# Patient Record
Sex: FEMALE | Race: WHITE | NOT HISPANIC OR LATINO | URBAN - METROPOLITAN AREA
[De-identification: names, ages, dates, MRNs, and addresses within clinical notes are randomized per-mention and may not be internally consistent; named-entity substitution may affect disease eponyms.]

---

## 2017-11-13 ENCOUNTER — IMPORTED ENCOUNTER (OUTPATIENT)
Dept: URBAN - METROPOLITAN AREA CLINIC 59 | Facility: CLINIC | Age: 47
End: 2017-11-13

## 2017-11-13 PROBLEM — H40.053 OCULAR HYPERTENSION, BILATERAL: Noted: 2017-11-13

## 2017-11-13 PROBLEM — H04.122 TEAR FILM INSUFFICIENCY OF LT LACRIMAL GLAND: Noted: 2017-11-13

## 2017-11-13 PROBLEM — H04.121 TEAR FILM INSUFFICIENCY OF RT LACRIMAL GLAND: Noted: 2017-11-13

## 2017-11-13 PROCEDURE — 92014 COMPRE OPH EXAM EST PT 1/>: CPT

## 2017-11-13 PROCEDURE — 83861 MICROFLUID ANALY TEARS: CPT

## 2018-11-12 ENCOUNTER — IMPORTED ENCOUNTER (OUTPATIENT)
Dept: URBAN - METROPOLITAN AREA CLINIC 59 | Facility: CLINIC | Age: 48
End: 2018-11-12

## 2018-11-12 PROBLEM — H04.121 TEAR FILM INSUFFICIENCY OF RT LACRIMAL GLAND: Noted: 2018-11-12

## 2018-11-12 PROBLEM — H40.053 OCULAR HYPERTENSION, BILATERAL: Noted: 2018-11-12

## 2018-11-12 PROBLEM — H04.122 TEAR FILM INSUFFICIENCY OF LT LACRIMAL GLAND: Noted: 2018-11-12

## 2018-11-12 PROCEDURE — 92020 GONIOSCOPY: CPT

## 2018-11-12 PROCEDURE — 92133 CPTRZD OPH DX IMG PST SGM ON: CPT

## 2018-11-12 PROCEDURE — 92014 COMPRE OPH EXAM EST PT 1/>: CPT

## 2018-11-12 PROCEDURE — 92083 EXTENDED VISUAL FIELD XM: CPT

## 2018-12-20 ENCOUNTER — IMPORTED ENCOUNTER (OUTPATIENT)
Dept: URBAN - METROPOLITAN AREA CLINIC 59 | Facility: CLINIC | Age: 48
End: 2018-12-20

## 2018-12-20 PROBLEM — H40.053 OCULAR HYPERTENSION, BILATERAL: Noted: 2018-12-20

## 2018-12-20 PROCEDURE — 92012 INTRM OPH EXAM EST PATIENT: CPT

## 2019-04-18 ENCOUNTER — IMPORTED ENCOUNTER (OUTPATIENT)
Dept: URBAN - METROPOLITAN AREA CLINIC 59 | Facility: CLINIC | Age: 49
End: 2019-04-18

## 2019-04-18 PROBLEM — H04.122 TEAR FILM INSUFFICIENCY OF LT LACRIMAL GLAND: Noted: 2019-04-18

## 2019-04-18 PROBLEM — H40.053 OCULAR HYPERTENSION, BILATERAL: Noted: 2019-04-18

## 2019-04-18 PROBLEM — H04.121 TEAR FILM INSUFFICIENCY OF RT LACRIMAL GLAND: Noted: 2019-04-18

## 2019-04-18 PROCEDURE — 83861 MICROFLUID ANALY TEARS: CPT

## 2019-04-18 PROCEDURE — 92012 INTRM OPH EXAM EST PATIENT: CPT

## 2019-07-30 ENCOUNTER — RX ONLY (OUTPATIENT)
Age: 49
Setting detail: RX ONLY
End: 2019-07-30

## 2019-07-30 ENCOUNTER — APPOINTMENT (RX ONLY)
Dept: URBAN - METROPOLITAN AREA CLINIC 348 | Facility: CLINIC | Age: 49
Setting detail: DERMATOLOGY
End: 2019-07-30

## 2019-07-30 DIAGNOSIS — Z80.8 FAMILY HISTORY OF MALIGNANT NEOPLASM OF OTHER ORGANS OR SYSTEMS: ICD-10-CM

## 2019-07-30 DIAGNOSIS — L81.4 OTHER MELANIN HYPERPIGMENTATION: ICD-10-CM

## 2019-07-30 DIAGNOSIS — L23.7 ALLERGIC CONTACT DERMATITIS DUE TO PLANTS, EXCEPT FOOD: ICD-10-CM

## 2019-07-30 DIAGNOSIS — L259 CONTACT DERMATITIS AND OTHER ECZEMA, UNSPECIFIED CAUSE: ICD-10-CM

## 2019-07-30 DIAGNOSIS — D485 NEOPLASM OF UNCERTAIN BEHAVIOR OF SKIN: ICD-10-CM

## 2019-07-30 DIAGNOSIS — D18.0 HEMANGIOMA: ICD-10-CM

## 2019-07-30 DIAGNOSIS — L82.1 OTHER SEBORRHEIC KERATOSIS: ICD-10-CM

## 2019-07-30 DIAGNOSIS — D22 MELANOCYTIC NEVI: ICD-10-CM

## 2019-07-30 PROBLEM — D18.01 HEMANGIOMA OF SKIN AND SUBCUTANEOUS TISSUE: Status: ACTIVE | Noted: 2019-07-30

## 2019-07-30 PROBLEM — D22.5 MELANOCYTIC NEVI OF TRUNK: Status: ACTIVE | Noted: 2019-07-30

## 2019-07-30 PROBLEM — L30.8 OTHER SPECIFIED DERMATITIS: Status: ACTIVE | Noted: 2019-07-30

## 2019-07-30 PROBLEM — D48.5 NEOPLASM OF UNCERTAIN BEHAVIOR OF SKIN: Status: ACTIVE | Noted: 2019-07-30

## 2019-07-30 PROCEDURE — ? SHAVE REMOVAL

## 2019-07-30 PROCEDURE — ? TREATMENT REGIMEN

## 2019-07-30 PROCEDURE — ? COUNSELING

## 2019-07-30 PROCEDURE — ? PRESCRIPTION

## 2019-07-30 PROCEDURE — 11300 SHAVE SKIN LESION 0.5 CM/<: CPT

## 2019-07-30 PROCEDURE — 99203 OFFICE O/P NEW LOW 30 MIN: CPT | Mod: 25

## 2019-07-30 RX ORDER — TRIAMCINOLONE ACETONIDE 1 MG/G
CREAM TOPICAL
Qty: 1 | Refills: 1 | Status: ERX | COMMUNITY
Start: 2019-07-30

## 2019-07-30 RX ORDER — CLOCORTOLONE PIVALATE 0 G/G
CREAM TOPICAL
Qty: 1 | Refills: 0 | Status: ERX | COMMUNITY
Start: 2019-07-30

## 2019-07-30 RX ORDER — HYDROCORTISONE 25 MG/G
CREAM TOPICAL
Qty: 1 | Refills: 1 | Status: ERX | COMMUNITY
Start: 2019-07-30

## 2019-07-30 RX ADMIN — TRIAMCINOLONE ACETONIDE: 1 CREAM TOPICAL at 15:20

## 2019-07-30 RX ADMIN — CLOCORTOLONE PIVALATE: 0 CREAM TOPICAL at 15:31

## 2019-07-30 ASSESSMENT — LOCATION DETAILED DESCRIPTION DERM
LOCATION DETAILED: EPIGASTRIC SKIN
LOCATION DETAILED: RIGHT DISTAL PRETIBIAL REGION
LOCATION DETAILED: RIGHT INFERIOR UPPER BACK
LOCATION DETAILED: LEFT LATERAL FOREHEAD
LOCATION DETAILED: RIGHT LATERAL FOREHEAD
LOCATION DETAILED: LEFT DISTAL PRETIBIAL REGION
LOCATION DETAILED: RIGHT SUPERIOR MEDIAL UPPER BACK
LOCATION DETAILED: LEFT MEDIAL SUPERIOR CHEST

## 2019-07-30 ASSESSMENT — LOCATION SIMPLE DESCRIPTION DERM
LOCATION SIMPLE: RIGHT UPPER BACK
LOCATION SIMPLE: ABDOMEN
LOCATION SIMPLE: LEFT FOREHEAD
LOCATION SIMPLE: RIGHT PRETIBIAL REGION
LOCATION SIMPLE: LEFT PRETIBIAL REGION
LOCATION SIMPLE: CHEST
LOCATION SIMPLE: RIGHT FOREHEAD

## 2019-07-30 ASSESSMENT — LOCATION ZONE DERM
LOCATION ZONE: FACE
LOCATION ZONE: TRUNK
LOCATION ZONE: LEG

## 2019-07-30 NOTE — PROCEDURE: SHAVE REMOVAL
Hemostasis: Aluminum Chloride
Path Notes (To The Dermatopathologist): Please check margins.
Billing Type: Third-Party Bill
Size Of Lesion In Cm (Required): 0.3
Bill For Surgical Tray: no
Medical Necessity Clause: This procedure was medically necessary because the lesion that was treated was:
X Size Of Lesion In Cm (Optional): 0
Anesthesia Volume In Cc: 0.5
Size Of Margin In Cm (Margins Are Not Added To Billing Dimensions): 0.1
Medical Necessity Information: It is in your best interest to select a reason for this procedure from the list below. All of these items fulfill various CMS LCD requirements except the new and changing color options.
Notification Instructions: Patient will return to clinic in 2-4 weeks for biopsy results. Patient understands results will not be given over the phone.
Post-Care Instructions: I reviewed with the patient in detail post-care instructions. Patient is to keep the biopsy site dry overnight, and then apply bacitracin twice daily until healed. Patient may apply hydrogen peroxide soaks to remove any crusting.
Was A Bandage Applied: Yes
Consent was obtained from the patient. The risks and benefits to therapy were discussed in detail. Specifically, the risks of infection, scarring, bleeding, prolonged wound healing, incomplete removal, allergy to anesthesia, nerve injury and recurrence were addressed. Prior to the procedure, the treatment site was clearly identified and confirmed by the patient. All components of Universal Protocol/PAUSE Rule completed.
Wound Care: Aquaphor
Anesthesia Type: 1% lidocaine with epinephrine
Detail Level: Detailed

## 2019-07-30 NOTE — PROCEDURE: TREATMENT REGIMEN
Discontinue Regimen: Anti aging products
Detail Level: Detailed
Initiate Treatment: Cloderm - Apply to the affected areas twice daily for two weeks, then as needed for flares
Initiate Treatment: Triamcinolone - Apply to the affected areas of the abdomen and pretib region twice daily x 2 weeks then as needed
Otc Regimen: Cetaphil or CeraVe cleanser

## 2019-08-20 ENCOUNTER — APPOINTMENT (RX ONLY)
Dept: URBAN - METROPOLITAN AREA CLINIC 348 | Facility: CLINIC | Age: 49
Setting detail: DERMATOLOGY
End: 2019-08-20

## 2019-08-20 DIAGNOSIS — L259 CONTACT DERMATITIS AND OTHER ECZEMA, UNSPECIFIED CAUSE: ICD-10-CM | Status: RESOLVING

## 2019-08-20 DIAGNOSIS — D22 MELANOCYTIC NEVI: ICD-10-CM

## 2019-08-20 DIAGNOSIS — L23.7 ALLERGIC CONTACT DERMATITIS DUE TO PLANTS, EXCEPT FOOD: ICD-10-CM | Status: IMPROVED

## 2019-08-20 PROBLEM — L30.8 OTHER SPECIFIED DERMATITIS: Status: ACTIVE | Noted: 2019-08-20

## 2019-08-20 PROBLEM — D22.5 MELANOCYTIC NEVI OF TRUNK: Status: ACTIVE | Noted: 2019-08-20

## 2019-08-20 PROCEDURE — 99213 OFFICE O/P EST LOW 20 MIN: CPT

## 2019-08-20 PROCEDURE — ? COUNSELING

## 2019-08-20 PROCEDURE — ? TREATMENT REGIMEN

## 2019-08-20 ASSESSMENT — LOCATION DETAILED DESCRIPTION DERM
LOCATION DETAILED: LEFT LATERAL FOREHEAD
LOCATION DETAILED: RIGHT DISTAL PRETIBIAL REGION
LOCATION DETAILED: LEFT DISTAL PRETIBIAL REGION
LOCATION DETAILED: EPIGASTRIC SKIN
LOCATION DETAILED: RIGHT LATERAL FOREHEAD

## 2019-08-20 ASSESSMENT — LOCATION ZONE DERM
LOCATION ZONE: LEG
LOCATION ZONE: TRUNK
LOCATION ZONE: FACE

## 2019-08-20 ASSESSMENT — LOCATION SIMPLE DESCRIPTION DERM
LOCATION SIMPLE: LEFT FOREHEAD
LOCATION SIMPLE: ABDOMEN
LOCATION SIMPLE: RIGHT PRETIBIAL REGION
LOCATION SIMPLE: RIGHT FOREHEAD
LOCATION SIMPLE: LEFT PRETIBIAL REGION

## 2019-08-20 NOTE — PROCEDURE: TREATMENT REGIMEN
Detail Level: Detailed
Otc Regimen: Cetaphil or CeraVe cleanser
Discontinue Regimen: Triamcinolone
Continue Regimen: Hydrocortisone 2.5%- Apply to the affected areas twice daily for two weeks, then as needed for flares

## 2019-09-09 ENCOUNTER — IMPORTED ENCOUNTER (OUTPATIENT)
Dept: URBAN - METROPOLITAN AREA CLINIC 59 | Facility: CLINIC | Age: 49
End: 2019-09-09

## 2019-09-09 PROBLEM — H04.122 TEAR FILM INSUFFICIENCY OF LT LACRIMAL GLAND: Noted: 2019-09-09

## 2019-09-09 PROBLEM — H25.13 BILATERAL NUCLEAR SCLEROSIS CATARACTS: Noted: 2019-09-09

## 2019-09-09 PROBLEM — H04.121 TEAR FILM INSUFFICIENCY OF RT LACRIMAL GLAND: Noted: 2019-09-09

## 2019-09-09 PROBLEM — H40.053 OCULAR HYPERTENSION, BILATERAL: Noted: 2019-09-09

## 2019-09-09 PROCEDURE — 92014 COMPRE OPH EXAM EST PT 1/>: CPT

## 2019-09-09 PROCEDURE — 92083 EXTENDED VISUAL FIELD XM: CPT

## 2019-09-09 PROCEDURE — 92133 CPTRZD OPH DX IMG PST SGM ON: CPT

## 2019-11-08 ENCOUNTER — APPOINTMENT (RX ONLY)
Dept: URBAN - METROPOLITAN AREA CLINIC 347 | Facility: CLINIC | Age: 49
Setting detail: DERMATOLOGY
End: 2019-11-08

## 2020-02-12 ENCOUNTER — IMPORTED ENCOUNTER (OUTPATIENT)
Dept: URBAN - METROPOLITAN AREA CLINIC 59 | Facility: CLINIC | Age: 50
End: 2020-02-12

## 2020-02-12 PROBLEM — H04.122 TEAR FILM INSUFFICIENCY OF LT LACRIMAL GLAND: Noted: 2020-02-12

## 2020-02-12 PROBLEM — H40.053 OCULAR HYPERTENSION, BILATERAL: Noted: 2020-02-12

## 2020-02-12 PROBLEM — H04.121 TEAR FILM INSUFFICIENCY OF RT LACRIMAL GLAND: Noted: 2020-02-12

## 2020-02-12 PROBLEM — H25.13 BILATERAL NUCLEAR SCLEROSIS CATARACTS: Noted: 2020-02-12

## 2020-02-12 PROCEDURE — 92133 CPTRZD OPH DX IMG PST SGM ON: CPT

## 2020-02-12 PROCEDURE — 92012 INTRM OPH EXAM EST PATIENT: CPT

## 2020-02-12 PROCEDURE — 92020 GONIOSCOPY: CPT

## 2020-08-12 ENCOUNTER — IMPORTED ENCOUNTER (OUTPATIENT)
Dept: URBAN - METROPOLITAN AREA CLINIC 59 | Facility: CLINIC | Age: 50
End: 2020-08-12

## 2020-08-12 PROBLEM — H40.053 OCULAR HYPERTENSION, BILATERAL: Noted: 2020-08-12

## 2020-08-12 PROBLEM — H04.122 TEAR FILM INSUFFICIENCY OF LT LACRIMAL GLAND: Noted: 2020-08-12

## 2020-08-12 PROBLEM — H25.13 BILATERAL NUCLEAR SCLEROSIS CATARACTS: Noted: 2020-08-12

## 2020-08-12 PROBLEM — H04.121 TEAR FILM INSUFFICIENCY OF RT LACRIMAL GLAND: Noted: 2020-08-12

## 2020-08-12 PROCEDURE — 92012 INTRM OPH EXAM EST PATIENT: CPT

## 2020-08-12 PROCEDURE — 92083 EXTENDED VISUAL FIELD XM: CPT

## 2020-10-09 ENCOUNTER — APPOINTMENT (RX ONLY)
Dept: URBAN - METROPOLITAN AREA CLINIC 348 | Facility: CLINIC | Age: 50
Setting detail: DERMATOLOGY
End: 2020-10-09

## 2020-10-09 DIAGNOSIS — Z80.8 FAMILY HISTORY OF MALIGNANT NEOPLASM OF OTHER ORGANS OR SYSTEMS: ICD-10-CM

## 2020-10-09 DIAGNOSIS — B07.8 OTHER VIRAL WARTS: ICD-10-CM

## 2020-10-09 DIAGNOSIS — D22 MELANOCYTIC NEVI: ICD-10-CM

## 2020-10-09 DIAGNOSIS — D18.0 HEMANGIOMA: ICD-10-CM

## 2020-10-09 DIAGNOSIS — L81.4 OTHER MELANIN HYPERPIGMENTATION: ICD-10-CM

## 2020-10-09 DIAGNOSIS — L82.1 OTHER SEBORRHEIC KERATOSIS: ICD-10-CM

## 2020-10-09 PROBLEM — D18.01 HEMANGIOMA OF SKIN AND SUBCUTANEOUS TISSUE: Status: ACTIVE | Noted: 2020-10-09

## 2020-10-09 PROBLEM — D22.5 MELANOCYTIC NEVI OF TRUNK: Status: ACTIVE | Noted: 2020-10-09

## 2020-10-09 PROCEDURE — ? PRESCRIPTION

## 2020-10-09 PROCEDURE — ? TREATMENT REGIMEN

## 2020-10-09 PROCEDURE — 99214 OFFICE O/P EST MOD 30 MIN: CPT | Mod: 25

## 2020-10-09 PROCEDURE — 17110 DESTRUCTION B9 LES UP TO 14: CPT

## 2020-10-09 PROCEDURE — ? COUNSELING

## 2020-10-09 PROCEDURE — ? LIQUID NITROGEN

## 2020-10-09 RX ORDER — SALICYLIC ACID 280 MG/G
SOLUTION TOPICAL
Qty: 1 | Refills: 1 | Status: ERX | COMMUNITY
Start: 2020-10-09

## 2020-10-09 RX ADMIN — SALICYLIC ACID: 280 SOLUTION TOPICAL at 00:00

## 2020-10-09 ASSESSMENT — LOCATION SIMPLE DESCRIPTION DERM
LOCATION SIMPLE: CHEST
LOCATION SIMPLE: RIGHT UPPER BACK
LOCATION SIMPLE: ABDOMEN
LOCATION SIMPLE: RIGHT 4TH TOE

## 2020-10-09 ASSESSMENT — LOCATION DETAILED DESCRIPTION DERM
LOCATION DETAILED: RIGHT 4TH TOENAIL
LOCATION DETAILED: EPIGASTRIC SKIN
LOCATION DETAILED: LEFT MEDIAL SUPERIOR CHEST
LOCATION DETAILED: RIGHT INFERIOR UPPER BACK
LOCATION DETAILED: RIGHT SUPERIOR MEDIAL UPPER BACK

## 2020-10-09 ASSESSMENT — LOCATION ZONE DERM
LOCATION ZONE: TOENAIL
LOCATION ZONE: TRUNK

## 2020-10-09 NOTE — PROCEDURE: LIQUID NITROGEN
Render Note In Bullet Format When Appropriate: No
Detail Level: Detailed
Post-Care Instructions: I reviewed with the patient in detail post-care instructions. Patient is to wear sunprotection, and avoid picking at any of the treated lesions. Pt may apply Vaseline to crusted or scabbing areas.
Medical Necessity Clause: This procedure was medically necessary because the lesions that were treated were:
Medical Necessity Information: It is in your best interest to select a reason for this procedure from the list below. All of these items fulfill various CMS LCD requirements except the new and changing color options.
Consent: The patient's consent was obtained including but not limited to risks of crusting, scabbing, blistering, scarring, darker or lighter pigmentary change, recurrence, incomplete removal and infection.
Number Of Freeze-Thaw Cycles: 2 freeze-thaw cycles

## 2020-11-06 ENCOUNTER — APPOINTMENT (RX ONLY)
Dept: URBAN - METROPOLITAN AREA CLINIC 348 | Facility: CLINIC | Age: 50
Setting detail: DERMATOLOGY
End: 2020-11-06

## 2020-11-06 DIAGNOSIS — B07.8 OTHER VIRAL WARTS: ICD-10-CM | Status: IMPROVED

## 2020-11-06 PROCEDURE — ? TREATMENT REGIMEN

## 2020-11-06 PROCEDURE — 17110 DESTRUCTION B9 LES UP TO 14: CPT

## 2020-11-06 PROCEDURE — ? COUNSELING

## 2020-11-06 PROCEDURE — ? LIQUID NITROGEN

## 2020-11-06 ASSESSMENT — LOCATION DETAILED DESCRIPTION DERM: LOCATION DETAILED: RIGHT DISTAL PLANTAR 4TH TOE

## 2020-11-06 ASSESSMENT — LOCATION ZONE DERM: LOCATION ZONE: TOE

## 2020-11-06 ASSESSMENT — LOCATION SIMPLE DESCRIPTION DERM: LOCATION SIMPLE: RIGHT 4TH TOE

## 2020-11-06 NOTE — PROCEDURE: LIQUID NITROGEN
Consent: The patient's consent was obtained including but not limited to risks of crusting, scabbing, blistering, scarring, darker or lighter pigmentary change, recurrence, incomplete removal and infection.
Post-Care Instructions: I reviewed with the patient in detail post-care instructions. Patient is to wear sunprotection, and avoid picking at any of the treated lesions. Pt may apply Vaseline to crusted or scabbing areas.
Render Post-Care Instructions In Note?: no
Detail Level: Detailed
Medical Necessity Information: It is in your best interest to select a reason for this procedure from the list below. All of these items fulfill various CMS LCD requirements except the new and changing color options.
Number Of Freeze-Thaw Cycles: 2 freeze-thaw cycles
Medical Necessity Clause: This procedure was medically necessary because the lesions that were treated were:

## 2020-12-23 ENCOUNTER — APPOINTMENT (RX ONLY)
Dept: URBAN - METROPOLITAN AREA CLINIC 348 | Facility: CLINIC | Age: 50
Setting detail: DERMATOLOGY
End: 2020-12-23

## 2020-12-23 DIAGNOSIS — B07.8 OTHER VIRAL WARTS: ICD-10-CM

## 2020-12-23 PROCEDURE — ? COUNSELING

## 2020-12-23 PROCEDURE — 17110 DESTRUCTION B9 LES UP TO 14: CPT

## 2020-12-23 PROCEDURE — ? LIQUID NITROGEN

## 2020-12-23 PROCEDURE — ? TREATMENT REGIMEN

## 2020-12-23 ASSESSMENT — LOCATION SIMPLE DESCRIPTION DERM: LOCATION SIMPLE: RIGHT 4TH TOE

## 2020-12-23 ASSESSMENT — LOCATION DETAILED DESCRIPTION DERM: LOCATION DETAILED: RIGHT DISTAL PLANTAR 4TH TOE

## 2020-12-23 ASSESSMENT — LOCATION ZONE DERM: LOCATION ZONE: TOE

## 2020-12-23 NOTE — PROCEDURE: LIQUID NITROGEN
Medical Necessity Clause: This procedure was medically necessary because the lesions that were treated were:
Number Of Freeze-Thaw Cycles: 2 freeze-thaw cycles
Render Note In Bullet Format When Appropriate: No
Post-Care Instructions: I reviewed with the patient in detail post-care instructions. Patient is to wear sunprotection, and avoid picking at any of the treated lesions. Pt may apply Vaseline to crusted or scabbing areas.
Detail Level: Detailed
Consent: The patient's consent was obtained including but not limited to risks of crusting, scabbing, blistering, scarring, darker or lighter pigmentary change, recurrence, incomplete removal and infection.
Medical Necessity Information: It is in your best interest to select a reason for this procedure from the list below. All of these items fulfill various CMS LCD requirements except the new and changing color options.

## 2021-02-10 ENCOUNTER — IMPORTED ENCOUNTER (OUTPATIENT)
Dept: URBAN - METROPOLITAN AREA CLINIC 59 | Facility: CLINIC | Age: 51
End: 2021-02-10

## 2021-02-10 PROBLEM — H25.13 BILATERAL NUCLEAR SCLEROSIS CATARACTS: Noted: 2021-02-10

## 2021-02-10 PROBLEM — H04.121 TEAR FILM INSUFFICIENCY OF RT LACRIMAL GLAND: Noted: 2021-02-10

## 2021-02-10 PROBLEM — H04.122 TEAR FILM INSUFFICIENCY OF LT LACRIMAL GLAND: Noted: 2021-02-10

## 2021-02-10 PROBLEM — H40.053 OCULAR HYPERTENSION, BILATERAL: Noted: 2021-02-10

## 2021-02-10 PROCEDURE — 92014 COMPRE OPH EXAM EST PT 1/>: CPT

## 2021-02-10 PROCEDURE — 92133 CPTRZD OPH DX IMG PST SGM ON: CPT

## 2021-08-04 ENCOUNTER — IMPORTED ENCOUNTER (OUTPATIENT)
Dept: URBAN - METROPOLITAN AREA CLINIC 59 | Facility: CLINIC | Age: 51
End: 2021-08-04

## 2021-08-04 PROBLEM — H43.812 VITREOUS DETACHMENT OF LT EYE: Noted: 2021-08-04

## 2021-08-04 PROBLEM — H40.053 OCULAR HYPERTENSION, BILATERAL: Noted: 2021-08-04

## 2021-08-04 PROBLEM — H04.121 TEAR FILM INSUFFICIENCY OF RT LACRIMAL GLAND: Noted: 2021-08-04

## 2021-08-04 PROBLEM — H04.122 TEAR FILM INSUFFICIENCY OF LT LACRIMAL GLAND: Noted: 2021-08-04

## 2021-08-04 PROBLEM — H25.13 BILATERAL NUCLEAR SCLEROSIS CATARACTS: Noted: 2021-08-04

## 2021-08-04 PROCEDURE — 92083 EXTENDED VISUAL FIELD XM: CPT

## 2021-08-04 PROCEDURE — 92014 COMPRE OPH EXAM EST PT 1/>: CPT

## 2021-10-29 ENCOUNTER — APPOINTMENT (RX ONLY)
Dept: URBAN - METROPOLITAN AREA CLINIC 348 | Facility: CLINIC | Age: 51
Setting detail: DERMATOLOGY
End: 2021-10-29

## 2021-10-29 DIAGNOSIS — Z80.8 FAMILY HISTORY OF MALIGNANT NEOPLASM OF OTHER ORGANS OR SYSTEMS: ICD-10-CM

## 2021-10-29 DIAGNOSIS — L82.1 OTHER SEBORRHEIC KERATOSIS: ICD-10-CM

## 2021-10-29 DIAGNOSIS — L81.4 OTHER MELANIN HYPERPIGMENTATION: ICD-10-CM

## 2021-10-29 DIAGNOSIS — D18.0 HEMANGIOMA: ICD-10-CM

## 2021-10-29 DIAGNOSIS — D22 MELANOCYTIC NEVI: ICD-10-CM

## 2021-10-29 DIAGNOSIS — B07.8 OTHER VIRAL WARTS: ICD-10-CM

## 2021-10-29 PROBLEM — D22.5 MELANOCYTIC NEVI OF TRUNK: Status: ACTIVE | Noted: 2021-10-29

## 2021-10-29 PROBLEM — D18.01 HEMANGIOMA OF SKIN AND SUBCUTANEOUS TISSUE: Status: ACTIVE | Noted: 2021-10-29

## 2021-10-29 PROCEDURE — 17110 DESTRUCTION B9 LES UP TO 14: CPT

## 2021-10-29 PROCEDURE — ? SUNSCREEN RECOMMENDATIONS

## 2021-10-29 PROCEDURE — ? COUNSELING

## 2021-10-29 PROCEDURE — ? LIQUID NITROGEN

## 2021-10-29 PROCEDURE — 99213 OFFICE O/P EST LOW 20 MIN: CPT | Mod: 25

## 2021-10-29 ASSESSMENT — LOCATION DETAILED DESCRIPTION DERM
LOCATION DETAILED: RIGHT 4TH TOENAIL
LOCATION DETAILED: EPIGASTRIC SKIN
LOCATION DETAILED: RIGHT DISTAL PLANTAR 3RD TOE
LOCATION DETAILED: LEFT MEDIAL SUPERIOR CHEST
LOCATION DETAILED: RIGHT SUPERIOR MEDIAL UPPER BACK
LOCATION DETAILED: RIGHT INFERIOR UPPER BACK

## 2021-10-29 ASSESSMENT — LOCATION SIMPLE DESCRIPTION DERM
LOCATION SIMPLE: CHEST
LOCATION SIMPLE: ABDOMEN
LOCATION SIMPLE: RIGHT 4TH TOE
LOCATION SIMPLE: RIGHT 3RD TOE
LOCATION SIMPLE: RIGHT UPPER BACK

## 2021-10-29 ASSESSMENT — LOCATION ZONE DERM
LOCATION ZONE: TRUNK
LOCATION ZONE: TOE
LOCATION ZONE: TOENAIL

## 2021-10-29 NOTE — PROCEDURE: LIQUID NITROGEN
Render Post-Care Instructions In Note?: no
Medical Necessity Information: It is in your best interest to select a reason for this procedure from the list below. All of these items fulfill various CMS LCD requirements except the new and changing color options.
Show Topical Anesthesia Variable?: Yes
Medical Necessity Clause: This procedure was medically necessary because the lesions that were treated were:
Post-Care Instructions: I reviewed with the patient in detail post-care instructions. Patient is to wear sunprotection, and avoid picking at any of the treated lesions. Pt may apply Vaseline to crusted or scabbing areas.
Consent: The patient's consent was obtained including but not limited to risks of crusting, scabbing, blistering, scarring, darker or lighter pigmentary change, recurrence, incomplete removal and infection.
Detail Level: Detailed
Number Of Freeze-Thaw Cycles: 2 freeze-thaw cycles

## 2021-12-10 ENCOUNTER — APPOINTMENT (RX ONLY)
Dept: URBAN - METROPOLITAN AREA CLINIC 348 | Facility: CLINIC | Age: 51
Setting detail: DERMATOLOGY
End: 2021-12-10

## 2021-12-10 DIAGNOSIS — B07.8 OTHER VIRAL WARTS: ICD-10-CM | Status: IMPROVED

## 2021-12-10 PROCEDURE — ? COUNSELING

## 2021-12-10 PROCEDURE — ? TREATMENT REGIMEN

## 2021-12-10 PROCEDURE — ? LIQUID NITROGEN

## 2021-12-10 PROCEDURE — 17110 DESTRUCTION B9 LES UP TO 14: CPT

## 2021-12-10 ASSESSMENT — LOCATION SIMPLE DESCRIPTION DERM
LOCATION SIMPLE: RIGHT 3RD TOE
LOCATION SIMPLE: RIGHT 4TH TOE

## 2021-12-10 ASSESSMENT — LOCATION ZONE DERM: LOCATION ZONE: TOE

## 2021-12-10 ASSESSMENT — LOCATION DETAILED DESCRIPTION DERM
LOCATION DETAILED: RIGHT LATERAL 3RD TOE
LOCATION DETAILED: RIGHT DISTAL PLANTAR 4TH TOE

## 2021-12-10 NOTE — PROCEDURE: LIQUID NITROGEN
Show Topical Anesthesia Variable?: Yes
Render Note In Bullet Format When Appropriate: No
Consent: The patient's consent was obtained including but not limited to risks of crusting, scabbing, blistering, scarring, darker or lighter pigmentary change, recurrence, incomplete removal and infection.
Number Of Freeze-Thaw Cycles: 2 freeze-thaw cycles
Post-Care Instructions: I reviewed with the patient in detail post-care instructions. Patient is to wear sunprotection, and avoid picking at any of the treated lesions. Pt may apply Vaseline to crusted or scabbing areas.
Detail Level: Detailed
Medical Necessity Information: It is in your best interest to select a reason for this procedure from the list below. All of these items fulfill various CMS LCD requirements except the new and changing color options.
Medical Necessity Clause: This procedure was medically necessary because the lesions that were treated were:

## 2022-02-09 ENCOUNTER — IMPORTED ENCOUNTER (OUTPATIENT)
Dept: URBAN - METROPOLITAN AREA CLINIC 59 | Facility: CLINIC | Age: 52
End: 2022-02-09

## 2022-02-09 PROBLEM — H43.812 VITREOUS DETACHMENT OF LT EYE: Noted: 2022-02-09

## 2022-02-09 PROBLEM — H04.122 TEAR FILM INSUFFICIENCY OF LT LACRIMAL GLAND: Noted: 2022-02-09

## 2022-02-09 PROBLEM — H04.121 TEAR FILM INSUFFICIENCY OF RT LACRIMAL GLAND: Noted: 2022-02-09

## 2022-02-09 PROBLEM — H40.053 OCULAR HYPERTENSION, BILATERAL: Noted: 2022-02-09

## 2022-02-09 PROBLEM — H25.13 BILATERAL NUCLEAR SCLEROSIS CATARACTS: Noted: 2022-02-09

## 2022-02-09 PROCEDURE — 99213 OFFICE O/P EST LOW 20 MIN: CPT

## 2022-02-09 PROCEDURE — 92133 CPTRZD OPH DX IMG PST SGM ON: CPT

## 2022-02-09 PROCEDURE — 83861 MICROFLUID ANALY TEARS: CPT

## 2022-08-10 ENCOUNTER — PREPPED CHART (OUTPATIENT)
Dept: URBAN - METROPOLITAN AREA CLINIC 59 | Facility: CLINIC | Age: 52
End: 2022-08-10

## 2022-08-10 ENCOUNTER — IMPORTED ENCOUNTER (OUTPATIENT)
Dept: URBAN - METROPOLITAN AREA CLINIC 59 | Facility: CLINIC | Age: 52
End: 2022-08-10

## 2022-08-10 PROBLEM — H04.123 DRY EYE SYNDROME: Noted: 2022-08-10

## 2022-08-10 PROBLEM — H40.053 OCULAR HYPERTENSION: Noted: 2022-08-10

## 2022-08-10 PROBLEM — H40.053 OCULAR HYPERTENSION, BILATERAL: Noted: 2022-08-10

## 2022-08-10 PROBLEM — H43.812 VITREOUS DEGENERATION: Noted: 2022-08-10

## 2022-08-10 PROBLEM — H04.122 DRY EYE SYNDROME: Noted: 2022-08-10

## 2022-08-10 PROBLEM — H25.13 BILATERAL NUCLEAR SCLEROSIS CATARACTS: Noted: 2022-08-10

## 2022-08-10 PROBLEM — H43.812 VITREOUS DETACHMENT OF LT EYE: Noted: 2022-08-10

## 2022-08-10 PROBLEM — H04.122 TEAR FILM INSUFFICIENCY OF LT LACRIMAL GLAND: Noted: 2022-08-10

## 2022-08-10 PROCEDURE — 92020 GONIOSCOPY: CPT

## 2022-08-10 PROCEDURE — 99213 OFFICE O/P EST LOW 20 MIN: CPT

## 2022-08-10 PROCEDURE — 92133 CPTRZD OPH DX IMG PST SGM ON: CPT

## 2022-11-04 ENCOUNTER — APPOINTMENT (RX ONLY)
Dept: URBAN - METROPOLITAN AREA CLINIC 341 | Facility: CLINIC | Age: 52
Setting detail: DERMATOLOGY
End: 2022-11-04

## 2022-11-04 DIAGNOSIS — L81.4 OTHER MELANIN HYPERPIGMENTATION: ICD-10-CM

## 2022-11-04 DIAGNOSIS — D22 MELANOCYTIC NEVI: ICD-10-CM

## 2022-11-04 DIAGNOSIS — D18.0 HEMANGIOMA: ICD-10-CM

## 2022-11-04 DIAGNOSIS — B07.8 OTHER VIRAL WARTS: ICD-10-CM | Status: INADEQUATELY CONTROLLED

## 2022-11-04 DIAGNOSIS — L82.1 OTHER SEBORRHEIC KERATOSIS: ICD-10-CM

## 2022-11-04 PROBLEM — D18.01 HEMANGIOMA OF SKIN AND SUBCUTANEOUS TISSUE: Status: ACTIVE | Noted: 2022-11-04

## 2022-11-04 PROBLEM — D22.5 MELANOCYTIC NEVI OF TRUNK: Status: ACTIVE | Noted: 2022-11-04

## 2022-11-04 PROCEDURE — 99213 OFFICE O/P EST LOW 20 MIN: CPT | Mod: 25

## 2022-11-04 PROCEDURE — ? OTC TREATMENT REGIMEN

## 2022-11-04 PROCEDURE — 17110 DESTRUCTION B9 LES UP TO 14: CPT

## 2022-11-04 PROCEDURE — ? LIQUID NITROGEN

## 2022-11-04 PROCEDURE — ? PRESCRIPTION

## 2022-11-04 PROCEDURE — ? PRESCRIPTION MEDICATION MANAGEMENT

## 2022-11-04 PROCEDURE — ? COUNSELING

## 2022-11-04 ASSESSMENT — LOCATION SIMPLE DESCRIPTION DERM
LOCATION SIMPLE: ABDOMEN
LOCATION SIMPLE: RIGHT 3RD TOE
LOCATION SIMPLE: CHEST
LOCATION SIMPLE: RIGHT UPPER BACK

## 2022-11-04 ASSESSMENT — LOCATION DETAILED DESCRIPTION DERM
LOCATION DETAILED: EPIGASTRIC SKIN
LOCATION DETAILED: LEFT MEDIAL SUPERIOR CHEST
LOCATION DETAILED: RIGHT INFERIOR UPPER BACK
LOCATION DETAILED: RIGHT SUPERIOR MEDIAL UPPER BACK
LOCATION DETAILED: RIGHT LATERAL 3RD TOE

## 2022-11-04 ASSESSMENT — LOCATION ZONE DERM
LOCATION ZONE: TOE
LOCATION ZONE: TRUNK

## 2022-11-09 ENCOUNTER — RX ONLY (OUTPATIENT)
Age: 52
Setting detail: RX ONLY
End: 2022-11-09

## 2022-12-23 ENCOUNTER — APPOINTMENT (RX ONLY)
Dept: URBAN - METROPOLITAN AREA CLINIC 341 | Facility: CLINIC | Age: 52
Setting detail: DERMATOLOGY
End: 2022-12-23

## 2022-12-23 DIAGNOSIS — B07.8 OTHER VIRAL WARTS: ICD-10-CM | Status: IMPROVED

## 2022-12-23 PROCEDURE — ? OTHER

## 2022-12-23 PROCEDURE — ? PRESCRIPTION MEDICATION MANAGEMENT

## 2022-12-23 PROCEDURE — ? COUNSELING

## 2022-12-23 PROCEDURE — 99213 OFFICE O/P EST LOW 20 MIN: CPT

## 2022-12-23 ASSESSMENT — LOCATION DETAILED DESCRIPTION DERM
LOCATION DETAILED: RIGHT DORSAL 4TH TOE
LOCATION DETAILED: RIGHT LATERAL 3RD TOE

## 2022-12-23 ASSESSMENT — LOCATION SIMPLE DESCRIPTION DERM
LOCATION SIMPLE: RIGHT 4TH TOE
LOCATION SIMPLE: RIGHT 3RD TOE

## 2022-12-23 ASSESSMENT — LOCATION ZONE DERM: LOCATION ZONE: TOE

## 2022-12-23 NOTE — PROCEDURE: OTHER
Render Risk Assessment In Note?: no
Note Text (......Xxx Chief Complaint.): This diagnosis correlates with the
Other (Free Text): BK parred lesion on 4th toe
Detail Level: Zone

## 2022-12-23 NOTE — PROCEDURE: PRESCRIPTION MEDICATION MANAGEMENT
Modify Regimen: Wart peel - take week off, if wart is still there ..restart wart peel .
Detail Level: Zone
Render In Strict Bullet Format?: No

## 2023-01-14 ASSESSMENT — TONOMETRY
OD_IOP_MMHG: 15
OS_IOP_MMHG: 15

## 2023-01-14 ASSESSMENT — VISUAL ACUITY
OD_CC: 20/20-1
OS_CC: 20/25+1

## 2023-02-08 ENCOUNTER — ESTABLISHED COMPREHENSIVE EXAM (OUTPATIENT)
Dept: URBAN - METROPOLITAN AREA CLINIC 59 | Facility: CLINIC | Age: 53
End: 2023-02-08

## 2023-02-08 DIAGNOSIS — H25.13: ICD-10-CM

## 2023-02-08 DIAGNOSIS — H40.053: ICD-10-CM

## 2023-02-08 DIAGNOSIS — H04.122: ICD-10-CM

## 2023-02-08 DIAGNOSIS — H43.812: ICD-10-CM

## 2023-02-08 PROCEDURE — 92250 FUNDUS PHOTOGRAPHY W/I&R: CPT

## 2023-02-08 PROCEDURE — 92014 COMPRE OPH EXAM EST PT 1/>: CPT

## 2023-02-08 PROCEDURE — 92083 EXTENDED VISUAL FIELD XM: CPT

## 2023-02-08 ASSESSMENT — VISUAL ACUITY
OD_CC: 20/20
OS_CC: 20/20-1

## 2023-02-08 ASSESSMENT — TONOMETRY
OS_IOP_MMHG: 17
OD_IOP_MMHG: 16

## 2023-08-09 ENCOUNTER — ESTABLISHED COMPREHENSIVE EXAM (OUTPATIENT)
Dept: URBAN - METROPOLITAN AREA CLINIC 59 | Facility: CLINIC | Age: 53
End: 2023-08-09

## 2023-08-09 DIAGNOSIS — H40.053: ICD-10-CM

## 2023-08-09 DIAGNOSIS — H43.812: ICD-10-CM

## 2023-08-09 DIAGNOSIS — H25.13: ICD-10-CM

## 2023-08-09 DIAGNOSIS — H04.123: ICD-10-CM

## 2023-08-09 PROCEDURE — 99214 OFFICE O/P EST MOD 30 MIN: CPT

## 2023-08-09 PROCEDURE — 92133 CPTRZD OPH DX IMG PST SGM ON: CPT

## 2023-08-09 ASSESSMENT — VISUAL ACUITY
OU_CC: 20/20
OS_CC: 20/25+2
OD_CC: 20/20-1

## 2023-08-09 ASSESSMENT — TONOMETRY
OD_IOP_MMHG: 16
OS_IOP_MMHG: 17

## 2023-10-14 ASSESSMENT — VISUAL ACUITY
OD_CC: 20/20
OS_CC: 20/20
OS_CC: 20/20
OD_CC: 20/20
OS_CC: 20/20-1
OD_CC: 20/20
OS_CC: 20/20
OD_CC: 20/20
OU_CC: 20/20
OS_CC: 20/20
OS_CC: 20/20
OD_CC: 20/20
OS_CC: 20/25+1
OU_CC: 20/20
OD_CC: 20/20
OS_CC: 20/25+2
OD_CC: 20/20
OS_CC: 20/20
OS_CC: 20/20
OD_CC: 20/20
OD_CC: 20/20-2
OD_CC: 20/20-1
OS_CC: 20/20
OD_CC: 20/20-1

## 2023-10-14 ASSESSMENT — TONOMETRY
OD_IOP_MMHG: 17
OD_IOP_MMHG: 14
OD_IOP_MMHG: 16
OS_IOP_MMHG: 16
OS_IOP_MMHG: 15
OD_IOP_MMHG: 16
OD_IOP_MMHG: 18
OD_IOP_MMHG: 21
OS_IOP_MMHG: 17
OD_IOP_MMHG: 13
OS_IOP_MMHG: 17
OS_IOP_MMHG: 14
OS_IOP_MMHG: 18
OD_IOP_MMHG: 17
OD_IOP_MMHG: 19
OD_IOP_MMHG: 15
OS_IOP_MMHG: 15
OS_IOP_MMHG: 13
OS_IOP_MMHG: 25
OS_IOP_MMHG: 18

## 2023-11-17 ENCOUNTER — RX ONLY (OUTPATIENT)
Age: 53
Setting detail: RX ONLY
End: 2023-11-17

## 2023-11-17 ENCOUNTER — APPOINTMENT (RX ONLY)
Dept: URBAN - METROPOLITAN AREA CLINIC 341 | Facility: CLINIC | Age: 53
Setting detail: DERMATOLOGY
End: 2023-11-17

## 2023-11-17 DIAGNOSIS — L81.4 OTHER MELANIN HYPERPIGMENTATION: ICD-10-CM

## 2023-11-17 DIAGNOSIS — L82.1 OTHER SEBORRHEIC KERATOSIS: ICD-10-CM

## 2023-11-17 DIAGNOSIS — D18.0 HEMANGIOMA: ICD-10-CM

## 2023-11-17 DIAGNOSIS — B07.8 OTHER VIRAL WARTS: ICD-10-CM | Status: INADEQUATELY CONTROLLED

## 2023-11-17 DIAGNOSIS — D22 MELANOCYTIC NEVI: ICD-10-CM

## 2023-11-17 PROBLEM — D18.01 HEMANGIOMA OF SKIN AND SUBCUTANEOUS TISSUE: Status: ACTIVE | Noted: 2023-11-17

## 2023-11-17 PROBLEM — D22.5 MELANOCYTIC NEVI OF TRUNK: Status: ACTIVE | Noted: 2023-11-17

## 2023-11-17 PROCEDURE — 99213 OFFICE O/P EST LOW 20 MIN: CPT

## 2023-11-17 PROCEDURE — ? PRESCRIPTION MEDICATION MANAGEMENT

## 2023-11-17 PROCEDURE — ? TREATMENT REGIMEN

## 2023-11-17 PROCEDURE — ? COUNSELING

## 2023-11-17 ASSESSMENT — LOCATION ZONE DERM
LOCATION ZONE: TOE
LOCATION ZONE: TRUNK

## 2023-11-17 ASSESSMENT — LOCATION DETAILED DESCRIPTION DERM
LOCATION DETAILED: RIGHT DORSAL 4TH TOE
LOCATION DETAILED: RIGHT LATERAL 3RD TOE
LOCATION DETAILED: SUBXIPHOID
LOCATION DETAILED: PERIUMBILICAL SKIN
LOCATION DETAILED: EPIGASTRIC SKIN

## 2023-11-17 ASSESSMENT — LOCATION SIMPLE DESCRIPTION DERM
LOCATION SIMPLE: RIGHT 3RD TOE
LOCATION SIMPLE: RIGHT 4TH TOE
LOCATION SIMPLE: ABDOMEN

## 2023-11-17 NOTE — PROCEDURE: PRESCRIPTION MEDICATION MANAGEMENT
Detail Level: Zone
Render In Strict Bullet Format?: No
Initiate Treatment: Wart Peel: Apply as directed

## 2024-03-13 ENCOUNTER — ESTABLISHED COMPREHENSIVE EXAM (OUTPATIENT)
Dept: URBAN - METROPOLITAN AREA CLINIC 59 | Facility: CLINIC | Age: 54
End: 2024-03-13

## 2024-03-13 DIAGNOSIS — H43.812: ICD-10-CM

## 2024-03-13 DIAGNOSIS — H25.13: ICD-10-CM

## 2024-03-13 DIAGNOSIS — H04.123: ICD-10-CM

## 2024-03-13 DIAGNOSIS — H40.053: ICD-10-CM

## 2024-03-13 PROCEDURE — 92014 COMPRE OPH EXAM EST PT 1/>: CPT

## 2024-03-13 PROCEDURE — 92083 EXTENDED VISUAL FIELD XM: CPT

## 2024-03-13 PROCEDURE — 92133 CPTRZD OPH DX IMG PST SGM ON: CPT

## 2024-03-13 ASSESSMENT — TONOMETRY
OS_IOP_MMHG: 16
OD_IOP_MMHG: 16

## 2024-03-13 ASSESSMENT — VISUAL ACUITY
OS_CC: 20/20
OD_CC: 20/20

## 2024-09-18 ENCOUNTER — ESTABLISHED COMPREHENSIVE EXAM (OUTPATIENT)
Dept: URBAN - METROPOLITAN AREA CLINIC 59 | Facility: CLINIC | Age: 54
End: 2024-09-18

## 2024-09-18 DIAGNOSIS — H40.053: ICD-10-CM

## 2024-09-18 DIAGNOSIS — H25.13: ICD-10-CM

## 2024-09-18 DIAGNOSIS — H43.812: ICD-10-CM

## 2024-09-18 DIAGNOSIS — H04.123: ICD-10-CM

## 2024-09-18 PROCEDURE — 92012 INTRM OPH EXAM EST PATIENT: CPT

## 2024-09-18 PROCEDURE — 92133 CPTRZD OPH DX IMG PST SGM ON: CPT

## 2024-09-18 ASSESSMENT — VISUAL ACUITY
OD_SC: 20/20
OS_SC: 20/20

## 2024-09-18 ASSESSMENT — TONOMETRY
OD_IOP_MMHG: 15
OS_IOP_MMHG: 16

## 2024-12-11 ENCOUNTER — APPOINTMENT (OUTPATIENT)
Dept: URBAN - METROPOLITAN AREA CLINIC 341 | Facility: CLINIC | Age: 54
Setting detail: DERMATOLOGY
End: 2024-12-11

## 2024-12-11 DIAGNOSIS — D18.0 HEMANGIOMA: ICD-10-CM

## 2024-12-11 DIAGNOSIS — L82.1 OTHER SEBORRHEIC KERATOSIS: ICD-10-CM

## 2024-12-11 DIAGNOSIS — L81.4 OTHER MELANIN HYPERPIGMENTATION: ICD-10-CM

## 2024-12-11 DIAGNOSIS — D22 MELANOCYTIC NEVI: ICD-10-CM

## 2024-12-11 PROBLEM — D22.5 MELANOCYTIC NEVI OF TRUNK: Status: ACTIVE | Noted: 2024-12-11

## 2024-12-11 PROBLEM — D18.01 HEMANGIOMA OF SKIN AND SUBCUTANEOUS TISSUE: Status: ACTIVE | Noted: 2024-12-11

## 2024-12-11 PROCEDURE — ? COUNSELING

## 2024-12-11 PROCEDURE — ? TREATMENT REGIMEN

## 2024-12-11 PROCEDURE — ? FULL BODY SKIN EXAM

## 2024-12-11 PROCEDURE — 99213 OFFICE O/P EST LOW 20 MIN: CPT

## 2024-12-11 ASSESSMENT — LOCATION SIMPLE DESCRIPTION DERM: LOCATION SIMPLE: ABDOMEN

## 2024-12-11 ASSESSMENT — LOCATION DETAILED DESCRIPTION DERM
LOCATION DETAILED: PERIUMBILICAL SKIN
LOCATION DETAILED: SUBXIPHOID
LOCATION DETAILED: EPIGASTRIC SKIN

## 2024-12-11 ASSESSMENT — LOCATION ZONE DERM: LOCATION ZONE: TRUNK

## 2025-03-26 ENCOUNTER — ESTABLISHED COMPREHENSIVE EXAM (OUTPATIENT)
Dept: URBAN - METROPOLITAN AREA CLINIC 59 | Facility: CLINIC | Age: 55
End: 2025-03-26

## 2025-03-26 DIAGNOSIS — H04.123: ICD-10-CM

## 2025-03-26 DIAGNOSIS — H25.13: ICD-10-CM

## 2025-03-26 DIAGNOSIS — H40.053: ICD-10-CM

## 2025-03-26 DIAGNOSIS — H43.812: ICD-10-CM

## 2025-03-26 PROCEDURE — 92083 EXTENDED VISUAL FIELD XM: CPT

## 2025-03-26 PROCEDURE — 92014 COMPRE OPH EXAM EST PT 1/>: CPT

## 2025-03-26 PROCEDURE — 92250 FUNDUS PHOTOGRAPHY W/I&R: CPT

## 2025-03-26 ASSESSMENT — VISUAL ACUITY
OS_CC: 20/20
OU_CC: 20/20
OD_CC: 20/20

## 2025-03-26 ASSESSMENT — TONOMETRY
OS_IOP_MMHG: 16
OD_IOP_MMHG: 15